# Patient Record
Sex: FEMALE | URBAN - METROPOLITAN AREA
[De-identification: names, ages, dates, MRNs, and addresses within clinical notes are randomized per-mention and may not be internally consistent; named-entity substitution may affect disease eponyms.]

---

## 2022-10-28 ENCOUNTER — APPOINTMENT (OUTPATIENT)
Dept: URBAN - METROPOLITAN AREA CLINIC 249 | Age: 37
Setting detail: DERMATOLOGY
End: 2022-10-28

## 2022-10-28 DIAGNOSIS — L68.0 HIRSUTISM: ICD-10-CM

## 2022-10-28 PROCEDURE — OTHER OTHER: OTHER

## 2022-10-28 PROCEDURE — OTHER PRESCRIPTION: OTHER

## 2022-10-28 PROCEDURE — OTHER ADDITIONAL NOTES: OTHER

## 2022-10-28 PROCEDURE — OTHER PRESCRIPTION MEDICATION MANAGEMENT: OTHER

## 2022-10-28 PROCEDURE — OTHER COUNSELING: OTHER

## 2022-10-28 PROCEDURE — 99202 OFFICE O/P NEW SF 15 MIN: CPT

## 2022-10-28 RX ORDER — EFLORNITHINE HYDROCHLORIDE 139 MG/G
CREAM TOPICAL BID
Qty: 45 | Refills: 3 | Status: ERX | COMMUNITY
Start: 2022-10-28

## 2022-10-28 ASSESSMENT — LOCATION SIMPLE DESCRIPTION DERM
LOCATION SIMPLE: RIGHT CHEEK
LOCATION SIMPLE: LEFT CHEEK
LOCATION SIMPLE: LEFT LIP
LOCATION SIMPLE: RIGHT LIP
LOCATION SIMPLE: UPPER LIP
LOCATION SIMPLE: CHIN

## 2022-10-28 ASSESSMENT — LOCATION DETAILED DESCRIPTION DERM
LOCATION DETAILED: RIGHT UPPER CUTANEOUS LIP
LOCATION DETAILED: LEFT UPPER CUTANEOUS LIP
LOCATION DETAILED: PHILTRUM
LOCATION DETAILED: RIGHT LATERAL BUCCAL CHEEK
LOCATION DETAILED: LEFT INFERIOR LATERAL BUCCAL CHEEK
LOCATION DETAILED: RIGHT CHIN

## 2022-10-28 ASSESSMENT — LOCATION ZONE DERM
LOCATION ZONE: FACE
LOCATION ZONE: LIP

## 2022-10-28 NOTE — PROCEDURE: PRESCRIPTION MEDICATION MANAGEMENT
Initiate Treatment: Vaniqa 13.9 % topical cream BID\\nQuantity: 45.0 g\\nSig: Apply twice daily to unwanted hair morning and night
Detail Level: Zone
Render In Strict Bullet Format?: No

## 2022-10-28 NOTE — PROCEDURE: COUNSELING
Patient Specific Counseling (Will Not Stick From Patient To Patient): Provider recommended pt. to get laser hair removal to permanently remove hair if medication does not work.
Detail Level: Detailed

## 2022-10-28 NOTE — PROCEDURE: ADDITIONAL NOTES
Render Risk Assessment In Note?: no
Detail Level: Simple
Additional Notes: Discussed laser hair removal with pt. Appointments available the first Monday and Tuesday of each month

## 2022-10-28 NOTE — PROCEDURE: OTHER
Other (Free Text): Patient has unwanted dark hair growth on the upper lip, chin/jawline, and neck. She has been trying Spironolactone from another doctor but would like to try other treatment options. Due to the hairs being dark in color she is a candidate for laser hair removal. She will make an appointment for Monday or Tuesday when we have the laser. Explained that she will have to get pricing from [facility] and that it may take several treatments to get desired results.
Note Text (......Xxx Chief Complaint.): This diagnosis correlates with the
Render Risk Assessment In Note?: no
Detail Level: Zone